# Patient Record
Sex: FEMALE
[De-identification: names, ages, dates, MRNs, and addresses within clinical notes are randomized per-mention and may not be internally consistent; named-entity substitution may affect disease eponyms.]

---

## 2019-01-16 ENCOUNTER — HOSPITAL ENCOUNTER (OUTPATIENT)
Dept: HOSPITAL 5 - SPVWC | Age: 64
Discharge: HOME | End: 2019-01-16
Attending: INTERNAL MEDICINE
Payer: COMMERCIAL

## 2019-01-16 DIAGNOSIS — R92.8: Primary | ICD-10-CM

## 2019-01-16 NOTE — ULTRASOUND REPORT
RIGHT DIGITAL DIAGNOSTIC MAMMOGRAM and RIGHT BREAST ULTRASOUND: 

01/16/19 08:00:00



CLINICAL: Recalled for asymmetry.



COMPARISON:12/14/18 screening mammogram



FINDINGS: Spot magnification MLO and exaggerated CC views were 

obtained.  Near complete effacement of asymmetry on the MLO views.  A 

7.5 mm circumscribed density is identified on the exaggerated view.  It 

is located near the muscle and has a small focus of excentric fat.



Ultrasound of the outer right breast was performed and demonstrated a 

lymph node with a small fatty hilum at 9:30 o'clock 7 cm from the 

nipple.  It measures 5.0 x 5.4 x 3.7 mm and correlates with the 

mammographic density.



IMPRESSION: A probably benign intramammary lymph node at 9:30 o'clock 7 

cm from the nipple.



BI-RADS CATEGORY:  3 - - Probably Benign



RECOMMENDATION: Six month followup right mammogram and ultrasound if 

needed.



ACR BI-RADS MAMMOGRAPHIC CODES:

0 = Needs additional imaging evaluation; 1 = Negative; 2 = Benign; 3 = 

Probably benign; 4 = Suspicious; 5 = Malignant; 6 = Known biopsy-proven 

malignancy



COMMENT:

      1.   Dense breast tissue, i.e., adenosis, fibrocystic 

            changes, etc., may obscure an underlying neoplasm.

      2.   Approximately 10% of cancers are not detected with

            mammography.

      3.   A negative mammography report should not delay biopsy 

            if a clinically suspicious mass is present.





COMMENT:

Patient follow-up letters are generated via our [employer] 

application.

## 2019-01-16 NOTE — MAMMOGRAPHY REPORT
RIGHT DIGITAL DIAGNOSTIC MAMMOGRAM and RIGHT BREAST ULTRASOUND: 

01/16/19 08:00:00



CLINICAL: Recalled for asymmetry.



COMPARISON:12/14/18 screening mammogram



FINDINGS: Spot magnification MLO and exaggerated CC views were 

obtained.  Near complete effacement of asymmetry on the MLO views.  A 

7.5 mm circumscribed density is identified on the exaggerated view.  It 

is located near the muscle and has a small focus of excentric fat.



Ultrasound of the outer right breast was performed and demonstrated a 

lymph node with a small fatty hilum at 9:30 o'clock 7 cm from the 

nipple.  It measures 5.0 x 5.4 x 3.7 mm and correlates with the 

mammographic density.



IMPRESSION: A probably benign intramammary lymph node at 9:30 o'clock 7 

cm from the nipple.



BI-RADS CATEGORY:  3 - - Probably Benign



RECOMMENDATION: Six month followup right mammogram and ultrasound if 

needed.



ACR BI-RADS MAMMOGRAPHIC CODES:

0 = Needs additional imaging evaluation; 1 = Negative; 2 = Benign; 3 = 

Probably benign; 4 = Suspicious; 5 = Malignant; 6 = Known biopsy-proven 

malignancy



COMMENT:

      1.   Dense breast tissue, i.e., adenosis, fibrocystic 

            changes, etc., may obscure an underlying neoplasm.

      2.   Approximately 10% of cancers are not detected with

            mammography.

      3.   A negative mammography report should not delay biopsy 

            if a clinically suspicious mass is present.





COMMENT:

Patient follow-up letters are generated via our [employer] 

application.

## 2019-02-21 ENCOUNTER — HOSPITAL ENCOUNTER (OUTPATIENT)
Dept: HOSPITAL 5 - XRAY | Age: 64
Discharge: HOME | End: 2019-02-21
Attending: INTERNAL MEDICINE
Payer: COMMERCIAL

## 2019-02-21 DIAGNOSIS — M54.32: Primary | ICD-10-CM

## 2019-02-21 DIAGNOSIS — M19.90: ICD-10-CM

## 2019-02-21 PROCEDURE — 72110 X-RAY EXAM L-2 SPINE 4/>VWS: CPT

## 2019-02-21 NOTE — XRAY REPORT
FINAL REPORT



EXAM:  XR SPINE LUMBOSACRAL 4+V



HISTORY:  SCIATICA, LEFT SIDE 



TECHNIQUE:  5 views of the lumbar spine



PRIORS:  None.



FINDINGS:  

The lumbar vertebral bodies are normal in height. Vertebral alignment is normal. The disc spaces appe
ar well-preserved.



There are calcifications in the right side of the abdomen that appear to be within the colon. There a
re calcified phleboliths in the pelvis. 



IMPRESSION:  

Normal L-spine series.

## 2019-03-28 ENCOUNTER — HOSPITAL ENCOUNTER (OUTPATIENT)
Dept: HOSPITAL 5 - LAB | Age: 64
Discharge: HOME | End: 2019-03-28
Attending: NURSE PRACTITIONER
Payer: COMMERCIAL

## 2019-03-28 DIAGNOSIS — E78.2: Primary | ICD-10-CM

## 2019-03-28 DIAGNOSIS — M19.90: ICD-10-CM

## 2019-03-28 DIAGNOSIS — R53.81: ICD-10-CM

## 2019-03-28 DIAGNOSIS — R53.83: ICD-10-CM

## 2019-03-28 LAB
ALBUMIN SERPL-MCNC: 4.2 G/DL (ref 3.9–5)
ALT SERPL-CCNC: 14 UNITS/L (ref 7–56)
BAND NEUTROPHILS # (MANUAL): 0 K/MM3
BUN SERPL-MCNC: 11 MG/DL (ref 7–17)
BUN/CREAT SERPL: 11 %
CALCIUM SERPL-MCNC: 9 MG/DL (ref 8.4–10.2)
HCT VFR BLD CALC: 42.5 % (ref 30.3–42.9)
HDLC SERPL-MCNC: 79 MG/DL (ref 40–59)
HEMOLYSIS INDEX: 1
HGB BLD-MCNC: 14.2 GM/DL (ref 10.1–14.3)
MCHC RBC AUTO-ENTMCNC: 34 % (ref 30–34)
MCV RBC AUTO: 94 FL (ref 79–97)
MYELOCYTES # (MANUAL): 0 K/MM3
PLATELET # BLD: 276 K/MM3 (ref 140–440)
PROMYELOCYTES # (MANUAL): 0 K/MM3
RBC # BLD AUTO: 4.52 M/MM3 (ref 3.65–5.03)
TOTAL CELLS COUNTED BLD: 100

## 2019-03-28 PROCEDURE — 80053 COMPREHEN METABOLIC PANEL: CPT

## 2019-03-28 PROCEDURE — 84443 ASSAY THYROID STIM HORMONE: CPT

## 2019-03-28 PROCEDURE — 80061 LIPID PANEL: CPT

## 2019-03-28 PROCEDURE — 36415 COLL VENOUS BLD VENIPUNCTURE: CPT

## 2019-03-28 PROCEDURE — 85025 COMPLETE CBC W/AUTO DIFF WBC: CPT

## 2019-03-28 PROCEDURE — 85007 BL SMEAR W/DIFF WBC COUNT: CPT

## 2019-06-24 ENCOUNTER — HOSPITAL ENCOUNTER (OUTPATIENT)
Dept: HOSPITAL 5 - LAB | Age: 64
Discharge: HOME | End: 2019-06-24
Attending: INTERNAL MEDICINE
Payer: COMMERCIAL

## 2019-06-24 DIAGNOSIS — E55.9: Primary | ICD-10-CM

## 2019-06-24 LAB
ALBUMIN SERPL-MCNC: 3.8 G/DL (ref 3.9–5)
ALT SERPL-CCNC: 14 UNITS/L (ref 7–56)
BASOPHILS # (AUTO): 0 K/MM3 (ref 0–0.1)
BASOPHILS NFR BLD AUTO: 0.7 % (ref 0–1.8)
BUN SERPL-MCNC: 12 MG/DL (ref 7–17)
BUN/CREAT SERPL: 12 %
CALCIUM SERPL-MCNC: 8.6 MG/DL (ref 8.4–10.2)
EOSINOPHIL # BLD AUTO: 0.1 K/MM3 (ref 0–0.4)
EOSINOPHIL NFR BLD AUTO: 1.6 % (ref 0–4.3)
HCT VFR BLD CALC: 40.7 % (ref 30.3–42.9)
HDLC SERPL-MCNC: 63 MG/DL (ref 40–59)
HEMOLYSIS INDEX: 4
HGB BLD-MCNC: 13.6 GM/DL (ref 10.1–14.3)
LYMPHOCYTES # BLD AUTO: 1.5 K/MM3 (ref 1.2–5.4)
LYMPHOCYTES NFR BLD AUTO: 27.9 % (ref 13.4–35)
MCHC RBC AUTO-ENTMCNC: 33 % (ref 30–34)
MCV RBC AUTO: 95 FL (ref 79–97)
MONOCYTES # (AUTO): 0.5 K/MM3 (ref 0–0.8)
MONOCYTES % (AUTO): 9.1 % (ref 0–7.3)
PLATELET # BLD: 217 K/MM3 (ref 140–440)
RBC # BLD AUTO: 4.29 M/MM3 (ref 3.65–5.03)

## 2019-06-24 PROCEDURE — 82306 VITAMIN D 25 HYDROXY: CPT

## 2019-06-24 PROCEDURE — 80053 COMPREHEN METABOLIC PANEL: CPT

## 2019-06-24 PROCEDURE — 80061 LIPID PANEL: CPT

## 2019-06-24 PROCEDURE — 36415 COLL VENOUS BLD VENIPUNCTURE: CPT

## 2019-06-24 PROCEDURE — 83036 HEMOGLOBIN GLYCOSYLATED A1C: CPT

## 2019-06-24 PROCEDURE — 84443 ASSAY THYROID STIM HORMONE: CPT

## 2019-06-24 PROCEDURE — 85025 COMPLETE CBC W/AUTO DIFF WBC: CPT

## 2019-06-27 LAB — VITAMIN D2 SERPL-MCNC: <4 NG/ML

## 2019-07-22 ENCOUNTER — HOSPITAL ENCOUNTER (OUTPATIENT)
Dept: HOSPITAL 5 - US | Age: 64
Discharge: HOME | End: 2019-07-22
Attending: INTERNAL MEDICINE
Payer: COMMERCIAL

## 2019-07-22 DIAGNOSIS — R10.2: Primary | ICD-10-CM

## 2019-07-22 PROCEDURE — 76830 TRANSVAGINAL US NON-OB: CPT

## 2019-07-22 NOTE — ULTRASOUND REPORT
Pelvic ultrasound



INDICATION: Pelvic pain



Only an endovaginal study was performed.



Uterus measures 6.5 x 2.6 x 4.1 cm. Endometrial stripe measures 4 mm. No fluid is seen within the end
ometrial canal. No uterine masses are obvious.



Ovaries are not visualized. No adnexal masses are seen.



Only a trace of free fluid is seen.



IMPRESSION: No significant abnormalities are seen



Signer Name: Carlyle Bobby MD 

Signed: 7/22/2019 4:00 PM

 Workstation Name: [id number]

## 2019-07-25 ENCOUNTER — HOSPITAL ENCOUNTER (OUTPATIENT)
Dept: HOSPITAL 5 - SPVWC | Age: 64
Discharge: HOME | End: 2019-07-25
Attending: INTERNAL MEDICINE
Payer: COMMERCIAL

## 2019-07-25 DIAGNOSIS — N60.01: Primary | ICD-10-CM

## 2019-07-25 NOTE — ULTRASOUND REPORT
RIGHT BREAST DIAGNOSTIC MAMMOGRAM WITH CAD AND RIGHT BREAST ULTRASOUND



HISTORY: Follow-up asymmetry.



COMPARISON: 1/16/2019



FINDINGS:

Right Breast Mammogram:  Digital CC and MLO views demonstrate a heterogeneously dense breast parenchy
mal pattern which somewhat lessens the sensitivity of the evaluation.  The previously described descr
ibed density is smaller and appears to be a lymph node. However, a new versus circumscribed focal asy
mmetry in the outer breast measures 8 mm.



Right Breast Ultrasound:  Sonographic evaluation of the outer right breast was performed and demonstr
ated an oval smooth hypoechoic mass or cyst at 9:00 5 cm from the nipple. It is near the pectoral mus
tushar and measures 8 x 8 x 5 mm. It correlates with the mammographic density. A few benign cysts and a 
couple of benign intramammary lymph nodes at 10:00 12 cm from the nipple.



**IMPRESSION**

 

A new 8 mm cyst versus solid mass at 9:00 5 cm from the nipple. Recommend ultrasound-guided needle as
piration/biopsy.

 

I discussed the findings and the recommendation for needle aspiration/biopsy with the patient at the 
time of the exam.

 

BIRADS 4:  Suspicious abnormality.



According to the [employer], yearly mammograms are recommended starting at age 40 
and continuing as long as a woman is in good health. Clinical Breast Exams should be part of a period
ic health exam-about every 3 years for women in their 20s and 30s and every year for women 40 and ove
r. Breast self exam is an option for women starting in their 20s. Any breast change noted on a breast
 self exam should be reported promptly to the patient's healthcare provider. Breast MRI is recommende
d for women with an approximately 20-25% or greater lifetime risk of breast cancer, including women w
ith a strong family history of breast or ovarian cancer and women who have been treated for Hodgkin's
 disease.



A negative Mammography report should not discourage follow up or biopsy of a clinically significant f
inding and/or abnormality.



Dense breast tissue may obscure small neoplasms.



Signer Name: Roger Raya MD 

Signed: 7/25/2019 1:19 PM

 Workstation Name: [id number]

## 2019-07-25 NOTE — MAMMOGRAPHY REPORT
RIGHT BREAST DIAGNOSTIC MAMMOGRAM WITH CAD AND RIGHT BREAST ULTRASOUND



HISTORY: Follow-up asymmetry.



COMPARISON: 1/16/2019



FINDINGS:

Right Breast Mammogram:  Digital CC and MLO views demonstrate a heterogeneously dense breast parenchy
mal pattern which somewhat lessens the sensitivity of the evaluation.  The previously described descr
ibed density is smaller and appears to be a lymph node. However, a new versus circumscribed focal asy
mmetry in the outer breast measures 8 mm.



Right Breast Ultrasound:  Sonographic evaluation of the outer right breast was performed and demonstr
ated an oval smooth hypoechoic mass or cyst at 9:00 5 cm from the nipple. It is near the pectoral mus
tushar and measures 8 x 8 x 5 mm. It correlates with the mammographic density. A few benign cysts and a 
couple of benign intramammary lymph nodes at 10:00 12 cm from the nipple.



**IMPRESSION**

 

A new 8 mm cyst versus solid mass at 9:00 5 cm from the nipple. Recommend ultrasound-guided needle as
piration/biopsy.

 

I discussed the findings and the recommendation for needle aspiration/biopsy with the patient at the 
time of the exam.

 

BIRADS 4:  Suspicious abnormality.



According to the [employer], yearly mammograms are recommended starting at age 40 
and continuing as long as a woman is in good health. Clinical Breast Exams should be part of a period
ic health exam-about every 3 years for women in their 20s and 30s and every year for women 40 and ove
r. Breast self exam is an option for women starting in their 20s. Any breast change noted on a breast
 self exam should be reported promptly to the patient's healthcare provider. Breast MRI is recommende
d for women with an approximately 20-25% or greater lifetime risk of breast cancer, including women w
ith a strong family history of breast or ovarian cancer and women who have been treated for Hodgkin's
 disease.



A negative Mammography report should not discourage follow up or biopsy of a clinically significant f
inding and/or abnormality.



Dense breast tissue may obscure small neoplasms.



Signer Name: Roger Raya MD 

Signed: 7/25/2019 1:19 PM

 Workstation Name: [id number]

## 2019-08-08 ENCOUNTER — HOSPITAL ENCOUNTER (OUTPATIENT)
Dept: HOSPITAL 5 - SPVWC | Age: 64
Discharge: HOME | End: 2019-08-08
Attending: SURGERY
Payer: COMMERCIAL

## 2019-08-08 DIAGNOSIS — Z91.81: ICD-10-CM

## 2019-08-08 DIAGNOSIS — M19.90: ICD-10-CM

## 2019-08-08 DIAGNOSIS — Z88.2: ICD-10-CM

## 2019-08-08 DIAGNOSIS — N60.01: Primary | ICD-10-CM

## 2019-08-08 NOTE — ULTRASOUND REPORT
Right CYST ASPIRATION



The procedure was explained to the patient and informed consent obtained.



PROCEDURE:  Using sonographic guidance, 6 mL of 1% buffered lidocaine, and a 22-gauge needle, 2 mL of
 fluid was removed from the posterior right breast at 9:00.. Because of its benign appearance, the fl
uid was discarded. No immediate complications occurred. The mass completely resolved upon aspiration 
consistent with a benign cyst.



INTERPRETATION:  Images made real-time during the procedure demonstrate the needle tip to be in the c
enter of the lesion and the lesion to collapse and disappear during aspiration.



Postprocedure mammogram showed complete resolution of previously described mass in the 9:00 position 
of the right breast.



Impression: Complete resolution of previously noted hypoechoic mass in the 9:00 position of the right
 breast.



BI-RADS 2: Benign findings



Thank you for allowing us to participate in the care of your patient.



Signer Name: Raegan Coley MD 

Signed: 8/8/2019 2:30 PM

 Workstation Name: [id number]

## 2020-02-11 ENCOUNTER — HOSPITAL ENCOUNTER (OUTPATIENT)
Dept: HOSPITAL 5 - SPVWC | Age: 65
Discharge: HOME | End: 2020-02-11
Attending: SURGERY
Payer: MEDICARE

## 2020-02-11 DIAGNOSIS — N60.02: ICD-10-CM

## 2020-02-11 DIAGNOSIS — N63.20: ICD-10-CM

## 2020-02-11 DIAGNOSIS — N60.01: Primary | ICD-10-CM

## 2020-02-11 PROCEDURE — 77066 DX MAMMO INCL CAD BI: CPT

## 2020-02-11 NOTE — ULTRASOUND REPORT
BILATERAL DIGITAL DIAGNOSTIC MAMMOGRAM WITH CAD 2/11/2020

BILATERAL LIMITED BREAST ULTRASOUND

 

INDICATION: Follow-up after right benign cyst aspiration at 9:00 8/8/2019. She is also due for screen
ing of the left breast.



TECHNIQUE:  Digital bilateral mammographic imaging was performed. Limited ultrasound was performed. T
his examination was interpreted with the benefit of Computer-Aided Detection (CAD) analysis. 



COMPARISON: 8/8/2019, 7/25/2019 and 12/14/2018 



FINDINGS: 



Breast Density: The breasts are heterogeneously dense, which may obscure small masses.



MAMMOGRAPHIC FINDINGS: There is no evidence of dominant mass, suspicious calcifications or architectu
ral distortion in the right breast. A new left retroareolar circumscribed 2.9 cm mass.



ULTRASOUND FINDINGS: Targeted ultrasound evaluation was performed of the area of interest.   Ultrasou
nd of the right breast demonstrated an oval relatively anechoic cyst at 9:00 5 cm from the nipple mark
suring 7 x 3 x 7 mm. It correlates with the previously aspirated cyst which measured 8 x 5 x 8 mm. It
 is not as apparent on the mammogram as it was previously. Ultrasound of the left breast demonstrated
 a bilobed subareolar cyst at 3:00 measuring 3.1 x 1.5 x 3.2 cm. This cyst correlates with the mammog
raphic mass. A cyst with low-level internal echoes at 3:00 3 cm from the nipple measures 1.3 x 0.6 x 
1.4 cm and a subareolar cyst at 2:00 measures 4 mm.



IMPRESSION: Bilateral benign cysts and no suspicious finding.



Follow up recommendation: Routine yearly



BI-RADS Category 2:  Benign.





A "normal" or negative report should not discourage follow up or biopsy of a clinically significant f
inding.



A written summary of these findings will be mailed to the patient. The patient will be entered into a
 mammography reporting system which will generate a reminder letter for the patient's next appointmen
t at the appropriate interval.



According to the [employer], yearly mammograms are recommended starting at age 40 
and continuing as long as a woman is in good health.  Breast MRI is recommended for women with an natasha
roximately 20-25% or greater lifetime risk of breast cancer, including women with a strong family his
tory of breast or ovarian cancer and women who have been treated for Hodgkin's disease.



Signer Name: Roger Raya MD 

Signed: 2/11/2020 11:21 AM

 Workstation Name: [id number]

## 2020-02-11 NOTE — MAMMOGRAPHY REPORT
BILATERAL DIGITAL DIAGNOSTIC MAMMOGRAM WITH CAD 2/11/2020

BILATERAL LIMITED BREAST ULTRASOUND

 

INDICATION: Follow-up after right benign cyst aspiration at 9:00 8/8/2019. She is also due for screen
ing of the left breast.



TECHNIQUE:  Digital bilateral mammographic imaging was performed. Limited ultrasound was performed. T
his examination was interpreted with the benefit of Computer-Aided Detection (CAD) analysis. 



COMPARISON: 8/8/2019, 7/25/2019 and 12/14/2018 



FINDINGS: 



Breast Density: The breasts are heterogeneously dense, which may obscure small masses.



MAMMOGRAPHIC FINDINGS: There is no evidence of dominant mass, suspicious calcifications or architectu
ral distortion in the right breast. A new left retroareolar circumscribed 2.9 cm mass.



ULTRASOUND FINDINGS: Targeted ultrasound evaluation was performed of the area of interest.   Ultrasou
nd of the right breast demonstrated an oval relatively anechoic cyst at 9:00 5 cm from the nipple mark
suring 7 x 3 x 7 mm. It correlates with the previously aspirated cyst which measured 8 x 5 x 8 mm. It
 is not as apparent on the mammogram as it was previously. Ultrasound of the left breast demonstrated
 a bilobed subareolar cyst at 3:00 measuring 3.1 x 1.5 x 3.2 cm. This cyst correlates with the mammog
raphic mass. A cyst with low-level internal echoes at 3:00 3 cm from the nipple measures 1.3 x 0.6 x 
1.4 cm and a subareolar cyst at 2:00 measures 4 mm.



IMPRESSION: Bilateral benign cysts and no suspicious finding.



Follow up recommendation: Routine yearly



BI-RADS Category 2:  Benign.





A "normal" or negative report should not discourage follow up or biopsy of a clinically significant f
inding.



A written summary of these findings will be mailed to the patient. The patient will be entered into a
 mammography reporting system which will generate a reminder letter for the patient's next appointmen
t at the appropriate interval.



According to the [employer], yearly mammograms are recommended starting at age 40 
and continuing as long as a woman is in good health.  Breast MRI is recommended for women with an natasha
roximately 20-25% or greater lifetime risk of breast cancer, including women with a strong family his
tory of breast or ovarian cancer and women who have been treated for Hodgkin's disease.



Signer Name: Roger Raya MD 

Signed: 2/11/2020 11:21 AM

 Workstation Name: [id number]

## 2021-02-16 ENCOUNTER — HOSPITAL ENCOUNTER (OUTPATIENT)
Dept: HOSPITAL 5 - SPVWC | Age: 66
Discharge: HOME | End: 2021-02-16
Attending: SURGERY
Payer: MEDICARE

## 2021-02-16 DIAGNOSIS — Z12.31: Primary | ICD-10-CM

## 2021-02-16 DIAGNOSIS — N64.89: ICD-10-CM

## 2021-02-16 PROCEDURE — 77067 SCR MAMMO BI INCL CAD: CPT

## 2021-02-16 NOTE — MAMMOGRAPHY REPORT
DIGITAL SCREENING MAMMOGRAM WITH CAD, 2/16/2021



CLINICAL INFORMATION / INDICATION: Routine screening mammography. 



TECHNIQUE:  Digital bilateral 2D mammography was obtained in the craniocaudal and mediolateral obliqu
e projections. This examination was interpreted with the benefit of Computer-Aided Detection analysis
.



COMPARISON: Bilateral mammogram 12/14/2018, right mammogram 7/25/2019, right mammogram 8/8/2019, bila
teral mammogram 2/11/2020, bilateral breast ultrasound 2/11/2020



FINDINGS: 



Breast Density: The breasts are heterogeneously dense, which may obscure small masses.



In the central 3:00 position of the left breast the nodularity seen previously has noticeably increas
ed. On prior study this appeared to be due to a high lobe large simple cyst as well as a moderately c
omplicated cyst. In the right breast posterior depth centrally at 11:00, an enlarging probable cyst i
s seen. On prior ultrasound in the 9:00 deep position a 7 mm probable cyst was seen which may be the 
same lesion but has enlarged radiographically.

 

IMPRESSION: Increase in nodularity bilaterally which probably is benign but given the change and the 
prior appearance on ultrasound I would suggest repeat ultrasound bilaterally.



Follow up recommendation: As above



BI-RADS Category 0: Incomplete. Needs additional imaging evaluation and/or prior mammograms for haroon black.





-------------------------------------------------------------------------------------------

A "normal" or negative report should not discourage follow up or biopsy of a clinically significant f
inding.



A written summary of these findings will be mailed to the patient. The patient will be entered into a
 mammography reporting system which will generate a reminder letter for the patient's next appointmen
t at the appropriate interval.



The [employer] recommends yearly mammograms starting at age 40 and continuing as l
beulah as a woman is in good health.  Breast MRI is recommended for women with an approximate 20-25% or 
greater lifetime risk of breast cancer, including women with a strong family history of breast or ova
little cancer or who have been treated for Hodgkin's disease.



Signer Name: Carlyle Bobby MD 

Signed: 2/16/2021 10:12 AM

Workstation Name: [id number]

## 2021-03-09 ENCOUNTER — HOSPITAL ENCOUNTER (OUTPATIENT)
Dept: HOSPITAL 5 - SPVWC | Age: 66
Discharge: HOME | End: 2021-03-09
Attending: SURGERY
Payer: MEDICARE

## 2021-03-09 DIAGNOSIS — N60.02: ICD-10-CM

## 2021-03-09 DIAGNOSIS — N60.01: Primary | ICD-10-CM

## 2021-03-09 DIAGNOSIS — R92.8: ICD-10-CM

## 2021-03-09 NOTE — ULTRASOUND REPORT
ULTRASOUND BREAST BILATERAL COMPLETE, 3/9/2021



CLINICAL INFORMATION / INDICATION: ABNORMAL MAMMO R92.8. Patient presents as a callback from screenin
g mammogram for further evaluation of nodular densities in both breasts.



TECHNIQUE: Complete sonographic evaluation of all 4 quadrants and retroareolar region was performed. 
 



COMPARISON: Prior mammogram 2/16/2021 and bilateral breast ultrasound 2/11/2020



FINDINGS: 

Right breast: Corresponding with the nodular density seen on recent mammogram, there is a 10 mm benig
n simple cyst in the right breast 9:00 position located 5 cm from the nipple. Complete ultrasound of 
the right breast reveals several additional subcentimeter benign simple and clustered cysts. No suspi
cious solid lesion identified.



Left breast: Corresponding with the nodular density seen on recent mammogram, there are clustered cys
ts in the left breast 3:00 position located 3 cm from the nipple, the larger measuring up to 3 cm. Co
mplete ultrasound of the left breast reveals several additional subcentimeter benign simple cysts. No
 suspicious solid lesion identified.





IMPRESSION: 

1. Bilateral benign cysts account for the nodular densities seen on recent mammogram. No suspicious s
olid lesion identified.



Follow up recommendation: Routine yearly



BI-RADS Category 2:  Benign.





-------------------------------------------------------------------------------------------

A normal or "negative" report should not preclude biopsy or follow-up of a clinically suspicious find
ing.



Signer Name: Nely Cano MD 

Signed: 3/9/2021 10:29 AM

Workstation Name: [id number]

## 2022-03-18 ENCOUNTER — HOSPITAL ENCOUNTER (OUTPATIENT)
Dept: HOSPITAL 5 - MAMMO | Age: 67
Discharge: HOME | End: 2022-03-18
Attending: SURGERY
Payer: MEDICARE

## 2022-03-18 DIAGNOSIS — N60.01: Primary | ICD-10-CM

## 2022-03-18 PROCEDURE — 77066 DX MAMMO INCL CAD BI: CPT

## 2022-03-18 NOTE — ULTRASOUND REPORT
BILATERAL DIGITAL DIAGNOSTIC MAMMOGRAM WITH CAD CONVENTIONAL, 3/18/2022

BILATERAL LIMITED BREAST ULTRASOUND

 

CLINICAL INFORMATION / INDICATION: History of bilateral breast nodules/cysts. R92.2



TECHNIQUE: Digital bilateral mammographic imaging was performed. Limited ultrasound was performed. Th
is examination was interpreted with the benefit of Computer-Aided Detection (CAD) analysis. 



COMPARISON: 8/17/2016 through 2/16/2021.



FINDINGS: 



Breast Density: The breasts are heterogeneously dense, which may obscure small masses.



MAMMOGRAPHIC FINDINGS: No dominant mass, suspicious calcifications, or architectural distortion in ei
ther breast. Benign-appearing nodularity bilaterally, left greater than right, has shown significant 
improvement. No new suspicious abnormality is seen.



ULTRASOUND FINDINGS: Targeted ultrasound evaluation was performed of the area of interest.   

Right: There is a 7.9 cm simple cyst in the right lateral breast at the 9:00 position 5 cm from the n
ipple which measured 1 cm previously. No new abnormality is seen.



Left: There are 2 adjacent simple and mildly complicated cysts at the 3:00 position 3 cm from the nip
ple. The largest lesion measures 1.6 cm. The largest lesion measured 3 cm previously. No new abnormal
ity is seen.





IMPRESSION: No mammographic or sonographic evidence of malignancy.



Follow up recommendation: Routine yearly



BI-RADS Category 2:  BENIGN.



-------------------------------------------------------------------------------------------

A "normal" or negative report should not discourage follow up or biopsy of a clinically significant f
inding.



A written summary of these findings will be mailed to the patient. The patient will be entered into a
 mammography reporting system which will generate a reminder letter for the patient's next appointmen
t at the appropriate interval.



According to the [employer], yearly mammograms are recommended starting at age 40 
and continuing as long as a woman is in good health.  Breast MRI is recommended for women with an natasha
roximately 20-25% or greater lifetime risk of breast cancer, including women with a strong family his
tory of breast or ovarian cancer and women who have been treated for Hodgkin's disease.



Signer Name: Sukh Aranda MD 

Signed: 3/18/2022 12:07 PM

Workstation Name: [id number]

## 2022-03-18 NOTE — MAMMOGRAPHY REPORT
BILATERAL DIGITAL DIAGNOSTIC MAMMOGRAM WITH CAD CONVENTIONAL, 3/18/2022

BILATERAL LIMITED BREAST ULTRASOUND

 

CLINICAL INFORMATION / INDICATION: History of bilateral breast nodules/cysts. R92.2



TECHNIQUE: Digital bilateral mammographic imaging was performed. Limited ultrasound was performed. Th
is examination was interpreted with the benefit of Computer-Aided Detection (CAD) analysis. 



COMPARISON: 8/17/2016 through 2/16/2021.



FINDINGS: 



Breast Density: The breasts are heterogeneously dense, which may obscure small masses.



MAMMOGRAPHIC FINDINGS: No dominant mass, suspicious calcifications, or architectural distortion in ei
ther breast. Benign-appearing nodularity bilaterally, left greater than right, has shown significant 
improvement. No new suspicious abnormality is seen.



ULTRASOUND FINDINGS: Targeted ultrasound evaluation was performed of the area of interest.   

Right: There is a 7.9 cm simple cyst in the right lateral breast at the 9:00 position 5 cm from the n
ipple which measured 1 cm previously. No new abnormality is seen.



Left: There are 2 adjacent simple and mildly complicated cysts at the 3:00 position 3 cm from the nip
ple. The largest lesion measures 1.6 cm. The largest lesion measured 3 cm previously. No new abnormal
ity is seen.





IMPRESSION: No mammographic or sonographic evidence of malignancy.



Follow up recommendation: Routine yearly



BI-RADS Category 2:  BENIGN.



-------------------------------------------------------------------------------------------

A "normal" or negative report should not discourage follow up or biopsy of a clinically significant f
inding.



A written summary of these findings will be mailed to the patient. The patient will be entered into a
 mammography reporting system which will generate a reminder letter for the patient's next appointmen
t at the appropriate interval.



According to the [employer], yearly mammograms are recommended starting at age 40 
and continuing as long as a woman is in good health.  Breast MRI is recommended for women with an natasha
roximately 20-25% or greater lifetime risk of breast cancer, including women with a strong family his
tory of breast or ovarian cancer and women who have been treated for Hodgkin's disease.



Signer Name: Sukh Aranda MD 

Signed: 3/18/2022 12:07 PM

Workstation Name: [id number]